# Patient Record
(demographics unavailable — no encounter records)

---

## 2024-11-07 NOTE — HISTORY OF PRESENT ILLNESS
[FreeTextEntry1] : Patient presents for follow up evaluation for multiple medical concerns [de-identified] : Patient presents for follow up evaluation for multiple medical concerns including: hypertension, diabetes mellitus, hypercholesterolemia, vitamin D deficiency, hypothyroid, arthritis anxiety, asthma and obesity,

## 2024-11-07 NOTE — REVIEW OF SYSTEMS
[Joint Swelling] : joint swelling [Muscle Pain] : muscle pain [Back Pain] : back pain [Anxiety] : anxiety [Negative] : Heme/Lymph [Suicidal] : not suicidal [Insomnia] : no insomnia [Depression] : no depression

## 2024-12-16 NOTE — HISTORY OF PRESENT ILLNESS
[de-identified] : 67 yo F with asthma, HTN, HLD, hypothyroidism presents for annual.  Pt was hospitalized at [facility] in Aug for 2 weeks-- almost needed dialysis-- had tunneled dialysis catheter with complication that later resolved. Renal biopsy showed acute tubular necrosis. Sees nephrologist now. Kidneys have significantly improved. Pt lost a lot of weight during hospitalization. Follows with outside cardiologist.-- not taking cholesterol medicine. Been on rosuvastatin in past but was not on it long.   Meds changed-- off olmesartan, now on amlodipine. Stopped metformin, now januvia.   Pt reports stopping NSAIDs-- thinks she was taking too much as she was in a lot of pain. Currently under care of ortho. Pain ok. Doing PT.   recently dx lung ca-- in process of staging. Spirits have been low.

## 2024-12-16 NOTE — HEALTH RISK ASSESSMENT
[Poor] : ~his/her~ mood as  poor [No] : In the past 12 months have you used drugs other than those required for medical reasons? No [No falls in past year] : Patient reported no falls in the past year [1] : 2) Feeling down, depressed, or hopeless for several days (1) [PHQ-2 Negative - No further assessment needed] : PHQ-2 Negative - No further assessment needed [de-identified] : exercises occasionally [de-identified] : regular [[id number]] : 2 [Never] : Never [Fully functional (bathing, dressing, toileting, transferring, walking, feeding)] : Fully functional (bathing, dressing, toileting, transferring, walking, feeding) [Fully functional (using the telephone, shopping, preparing meals, housekeeping, doing laundry, using] : Fully functional and needs no help or supervision to perform IADLs (using the telephone, shopping, preparing meals, housekeeping, doing laundry, using transportation, managing medications and managing finances) [Reports changes in hearing] : Reports no changes in hearing [Reports changes in vision] : Reports no changes in vision [Reports normal functional visual acuity (ie: able to read med bottle)] : Reports normal functional visual acuity [Reports changes in dental health] : Reports no changes in dental health [MammogramDate] : 2022 [PapSmearDate] : 2022 [BoneDensityDate] : 2022 [ColonoscopyDate] : 2014 [ColonoscopyComments] : Was scheduled but was hospitalized at time.
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
## 2025-03-28 NOTE — PHYSICAL EXAM
[Normal] : no acute distress, well nourished, well developed and well-appearing [Normal Mental Status] : the patient's orientation, memory, attention, language and fund of knowledge were normal [Anxious] : anxious [Depressed] : depressed [Labile] : labile